# Patient Record
Sex: FEMALE | Race: WHITE | NOT HISPANIC OR LATINO
[De-identification: names, ages, dates, MRNs, and addresses within clinical notes are randomized per-mention and may not be internally consistent; named-entity substitution may affect disease eponyms.]

---

## 2017-06-28 PROBLEM — Z00.00 ENCOUNTER FOR PREVENTIVE HEALTH EXAMINATION: Status: ACTIVE | Noted: 2017-06-28

## 2017-07-19 ENCOUNTER — APPOINTMENT (OUTPATIENT)
Dept: UROGYNECOLOGY | Facility: CLINIC | Age: 52
End: 2017-07-19

## 2022-12-21 ENCOUNTER — APPOINTMENT (OUTPATIENT)
Dept: NEUROSURGERY | Facility: CLINIC | Age: 57
End: 2022-12-21

## 2022-12-21 VITALS — BODY MASS INDEX: 28.32 KG/M2 | HEIGHT: 61 IN | WEIGHT: 150 LBS

## 2022-12-21 DIAGNOSIS — Z80.0 FAMILY HISTORY OF MALIGNANT NEOPLASM OF DIGESTIVE ORGANS: ICD-10-CM

## 2022-12-21 DIAGNOSIS — Z87.19 PERSONAL HISTORY OF OTHER DISEASES OF THE DIGESTIVE SYSTEM: ICD-10-CM

## 2022-12-21 DIAGNOSIS — Z78.9 OTHER SPECIFIED HEALTH STATUS: ICD-10-CM

## 2022-12-21 DIAGNOSIS — I10 ESSENTIAL (PRIMARY) HYPERTENSION: ICD-10-CM

## 2022-12-21 PROCEDURE — 99204 OFFICE O/P NEW MOD 45 MIN: CPT

## 2022-12-21 RX ORDER — IBUPROFEN 800 MG/1
TABLET, FILM COATED ORAL
Refills: 0 | Status: ACTIVE | COMMUNITY

## 2022-12-21 RX ORDER — PANTOPRAZOLE SODIUM 20 MG/1
TABLET, DELAYED RELEASE ORAL
Refills: 0 | Status: ACTIVE | COMMUNITY

## 2022-12-21 NOTE — ASSESSMENT
[FreeTextEntry1] : Ms. Thomas has failed conservative management.  I think she would be a great candidate for an L2-3, L3-4 XLIF from a left sided approach.  However, I would like to see xrays with flexion extension of the lumbar spine, pelvis and hips to rule out instability at other levels, sacroiliac or hip issues.  She will follow-up with me after the xrays.

## 2022-12-21 NOTE — HISTORY OF PRESENT ILLNESS
[FreeTextEntry1] : low back pain radiating to the left leg [de-identified] : Ms. Thomas is a 57 year-old female who presents with low back pain radiating to her left leg and mildly to the right leg.  The pain radiates to the left hip and around to the front of the thigh and knee.  It is associated with numbness and tingling.  It is made worse with standing and ambulating.  It has gotten worse in the past 6 months.  She has had extensive PT and epidural steroid injections.\par \par MRI of the lumbar spine 11/2022 demonstrates severe DDD and scoliosis curve at L2-3 and L3-4 with left greater than right foraminal stenosis

## 2023-01-05 ENCOUNTER — APPOINTMENT (OUTPATIENT)
Dept: NEUROSURGERY | Facility: CLINIC | Age: 58
End: 2023-01-05
Payer: MEDICAID

## 2023-01-05 PROCEDURE — 99441: CPT

## 2023-01-10 NOTE — REASON FOR VISIT
[FreeTextEntry1] : Reviewed xrays with patient.  severe left hip OA.  \par \par Recommend addressing hip first\par referral given for ortho\par Will return after hip os addressed.

## 2023-05-15 ENCOUNTER — APPOINTMENT (OUTPATIENT)
Dept: NEUROSURGERY | Facility: CLINIC | Age: 58
End: 2023-05-15
Payer: MEDICAID

## 2023-05-15 VITALS — WEIGHT: 152 LBS | BODY MASS INDEX: 28.7 KG/M2 | HEIGHT: 61 IN

## 2023-05-15 PROCEDURE — 99213 OFFICE O/P EST LOW 20 MIN: CPT

## 2023-05-16 NOTE — REASON FOR VISIT
[Follow-Up: _____] : a [unfilled] follow-up visit [Family Member] : family member [FreeTextEntry1] : Ms. Rossi is a 57 year-old female who presents with left lower back pain radiating to her left groin and anterior thigh.  She had PT and ESIs extensively.  Upon work-up she had an L2-3, L3-4 deformity with left foraminal stenosis and disc.  She also had severe osteoarthritis of the hip.  She had a hip replacement 3 months ago.  Her groin pain resolved.  However, she reports the same back and thigh pain unresponsive to conservative measures.

## 2023-05-16 NOTE — ASSESSMENT
[FreeTextEntry1] : Ms. Rossi needs an updated MRI.  Provided there are no changes, I recommend a left L2-3, L3-4 minimally invasive transforaminal interbody fusion.  Risks and benefits were discussed and she wishes to proceed.

## 2023-06-07 ENCOUNTER — APPOINTMENT (OUTPATIENT)
Dept: HEART AND VASCULAR | Facility: CLINIC | Age: 58
End: 2023-06-07

## 2023-06-07 ENCOUNTER — NON-APPOINTMENT (OUTPATIENT)
Age: 58
End: 2023-06-07

## 2023-06-07 ENCOUNTER — APPOINTMENT (OUTPATIENT)
Dept: HEART AND VASCULAR | Facility: CLINIC | Age: 58
End: 2023-06-07
Payer: MEDICAID

## 2023-06-07 VITALS
WEIGHT: 150 LBS | SYSTOLIC BLOOD PRESSURE: 124 MMHG | HEIGHT: 61 IN | HEART RATE: 70 BPM | DIASTOLIC BLOOD PRESSURE: 80 MMHG | BODY MASS INDEX: 28.32 KG/M2 | RESPIRATION RATE: 12 BRPM

## 2023-06-07 DIAGNOSIS — R01.1 CARDIAC MURMUR, UNSPECIFIED: ICD-10-CM

## 2023-06-07 DIAGNOSIS — Z01.810 ENCOUNTER FOR PREPROCEDURAL CARDIOVASCULAR EXAMINATION: ICD-10-CM

## 2023-06-07 PROCEDURE — 93000 ELECTROCARDIOGRAM COMPLETE: CPT | Mod: NC

## 2023-06-07 PROCEDURE — 93306 TTE W/DOPPLER COMPLETE: CPT

## 2023-06-07 PROCEDURE — 99204 OFFICE O/P NEW MOD 45 MIN: CPT | Mod: 25

## 2023-06-07 NOTE — PHYSICAL EXAM
[General Appearance - Well Developed] : well developed [Normal Appearance] : normal appearance [Well Groomed] : well groomed [General Appearance - Well Nourished] : well nourished [No Deformities] : no deformities [General Appearance - In No Acute Distress] : no acute distress [Normal Oral Mucosa] : normal oral mucosa [No Oral Pallor] : no oral pallor [No Oral Cyanosis] : no oral cyanosis [Normal Jugular Venous A Waves Present] : normal jugular venous A waves present [Normal Jugular Venous V Waves Present] : normal jugular venous V waves present [No Jugular Venous Reeder A Waves] : no jugular venous reeder A waves [Respiration, Rhythm And Depth] : normal respiratory rhythm and effort [Exaggerated Use Of Accessory Muscles For Inspiration] : no accessory muscle use [Auscultation Breath Sounds / Voice Sounds] : lungs were clear to auscultation bilaterally [Heart Rate And Rhythm] : heart rate and rhythm were normal [Heart Sounds] : normal S1 and S2 [Arterial Pulses Normal] : the arterial pulses were normal [Edema] : no peripheral edema present [FreeTextEntry1] : III/VI SARATH [Abdomen Soft] : soft [Abdomen Tenderness] : non-tender [Abdomen Mass (___ Cm)] : no abdominal mass palpated [Abnormal Walk] : normal gait [Nail Clubbing] : no clubbing of the fingernails [Cyanosis, Localized] : no localized cyanosis [Petechial Hemorrhages (___cm)] : no petechial hemorrhages [] : no ischemic changes [Oriented To Time, Place, And Person] : oriented to person, place, and time [Affect] : the affect was normal [Mood] : the mood was normal [No Anxiety] : not feeling anxious

## 2023-06-07 NOTE — HISTORY OF PRESENT ILLNESS
[Preoperative Visit] : for a medical evaluation prior to surgery [Scheduled Procedure ___] : a [unfilled] [Good] : Good [Chest Pain] : no chest pain [Dyspnea] : no dyspnea [Lower Extremity Swelling] : no lower extremity swelling [FreeTextEntry1] : 57-year-old female with a past medical history of spondylosis comes in for preop clearance for elective spine surgery.  Patient does about 3 METS of daily activity without chest pain shortness of breath PND orthopnea.  She can walk for 30 minutes uninterrupted without symptoms.

## 2023-06-07 NOTE — DISCUSSION/SUMMARY
[Procedure Intermediate Risk] : the procedure risk is intermediate [Patient Low Risk] : the patient is a low surgical risk [Optimized for Surgery] : the patient is optimized for surgery [As per surgery] : as per surgery [Continue] : Continue medications as currently directed [FreeTextEntry1] : 1.  Preop clearance for elective spine surgery: Patient does 3 METS of daily activity without chest pain shortness of breath PND orthopnea.  EKG reviewed normal sinus rhythm with  APCs, no cardiac contraindication to planned procedure may proceed as planned.\par 2.  Cardiac murmur: Echocardiogram

## 2023-06-12 ENCOUNTER — NON-APPOINTMENT (OUTPATIENT)
Age: 58
End: 2023-06-12

## 2023-06-13 ENCOUNTER — APPOINTMENT (OUTPATIENT)
Dept: PAIN MANAGEMENT | Facility: CLINIC | Age: 58
End: 2023-06-13
Payer: MEDICAID

## 2023-06-13 ENCOUNTER — TRANSCRIPTION ENCOUNTER (OUTPATIENT)
Age: 58
End: 2023-06-13

## 2023-06-13 PROCEDURE — 99204 OFFICE O/P NEW MOD 45 MIN: CPT | Mod: 95

## 2023-06-15 NOTE — ASU PATIENT PROFILE, ADULT - VISION (WITH CORRECTIVE LENSES IF THE PATIENT USUALLY WEARS THEM):
locker 1/Partially impaired: cannot see medication labels or newsprint, but can see obstacles in path, and the surrounding layout; can count fingers at arm's length

## 2023-06-15 NOTE — DISCUSSION/SUMMARY
[de-identified] : Plan is for lumbar spine surgery with Dr. Taylor for lumbar spondylosis \par \par Postoperative Pain Medication recommendations\par \par start tylenol 975mg q8h standing\par start methocarbomal 750mg q8hr standing\par start oxycodone 5mg q4h prn\par start oxycodone 10mg q4hr prn\par start dilaudid 0.5mg q6hr prn\par \par \par \par iStop was checked an not on chronic opioid therapy\par

## 2023-06-15 NOTE — HISTORY OF PRESENT ILLNESS
[FreeTextEntry1] : \par Ms. Rossi is a 57 year-old female who presents with left lower back pain radiating to her left groin and anterior thigh. She had completed physical therapy and epidural steroid injections without relief. She was found to have an L2-3, L3-4 deformity with left foraminal stenosis and disc pathology. She also had severe osteoarthritis of the hip. She had a hip replacement 3 months ago. Her groin pain resolved. However, she reports the same back and thigh pain unresponsive to conservative measures. Pt denies bowel/bladder incontinence, weakness, falls, unsteadiness. The pain reaches an 8/10 in intensity which impedes her ADLs and QOL. She is here today for perioperative new patient visit for a left sided L2-L3, L3-4 minimally invasive transforaminal interbody fusion with Dr. Taylor. \par

## 2023-06-15 NOTE — ASU PATIENT PROFILE, ADULT - NSICDXPASTMEDICALHX_GEN_ALL_CORE_FT
PAST MEDICAL HISTORY:  GERD (gastroesophageal reflux disease)     HTN (hypertension)     S/P hysterectomy

## 2023-06-15 NOTE — CONSULT NOTE ADULT - SUBJECTIVE AND OBJECTIVE BOX
History of Present Illness    Ms. Rossi is a 57 year-old female who presents with left lower back pain radiating to her left groin and anterior thigh. She had completed physical therapy and epidural steroid injections without relief. She was found to have an L2-3, L3-4 deformity with left foraminal stenosis and disc pathology. She also had severe osteoarthritis of the hip. She had a hip replacement 3 months ago. Her groin pain resolved. However, she reports the same back and thigh pain unresponsive to conservative measures. Pt denies bowel/bladder incontinence, weakness, falls, unsteadiness. The pain reaches an 8/10 in intensity which impedes her ADLs and QOL. She is here today for perioperative new patient visit for a left sided L2-L3, L3-4 minimally invasive transforaminal interbody fusion with Dr. Taylor.        Current Meds  Ibuprofen TABS  Protonix TBEC    Allergies  Mobic    Physical Exam  telemedicine visit  General: WDWN female in NAD, sitting in a chair  lungs: no accessory muscle usage, no audible wheeze while observing via video        Assessment  Lumbar spondylosis (721.3) (M47.816)    Discussion/Summary   Plan is for lumbar spine surgery with Dr. Taylor for lumbar spondylosis     Postoperative Pain Medication recommendations    start tylenol 975mg q8h standing  start methocarbomal 750mg q8hr standing  start oxycodone 5mg q4h prn  start oxycodone 10mg q4hr prn  start dilaudid 0.5mg q6hr prn        iStop was checked an not on chronic opioid therapy

## 2023-06-15 NOTE — PHYSICAL EXAM
[de-identified] : General: WDWN female in NAD, sitting in a chair\par lungs: no accessory muscle usage, no audible wheeze while observing via video\par

## 2023-06-16 ENCOUNTER — INPATIENT (INPATIENT)
Facility: HOSPITAL | Age: 58
LOS: 2 days | Discharge: HOME HEALTH | DRG: 304 | End: 2023-06-19
Attending: NEUROLOGICAL SURGERY | Admitting: NEUROLOGICAL SURGERY
Payer: MEDICAID

## 2023-06-16 ENCOUNTER — APPOINTMENT (OUTPATIENT)
Dept: NEUROSURGERY | Facility: HOSPITAL | Age: 58
End: 2023-06-16

## 2023-06-16 VITALS
TEMPERATURE: 98 F | RESPIRATION RATE: 16 BRPM | SYSTOLIC BLOOD PRESSURE: 145 MMHG | OXYGEN SATURATION: 99 % | HEIGHT: 61 IN | DIASTOLIC BLOOD PRESSURE: 65 MMHG | WEIGHT: 149.91 LBS | HEART RATE: 59 BPM

## 2023-06-16 DIAGNOSIS — M47.816 SPONDYLOSIS WITHOUT MYELOPATHY OR RADICULOPATHY, LUMBAR REGION: ICD-10-CM

## 2023-06-16 LAB
ABO RH CONFIRMATION: SIGNIFICANT CHANGE UP
ALBUMIN SERPL ELPH-MCNC: 3.8 G/DL — SIGNIFICANT CHANGE UP (ref 3.5–5.2)
ALP SERPL-CCNC: 120 U/L — HIGH (ref 30–115)
ALT FLD-CCNC: 220 U/L — HIGH (ref 0–41)
ANION GAP SERPL CALC-SCNC: 8 MMOL/L — SIGNIFICANT CHANGE UP (ref 7–14)
AST SERPL-CCNC: 515 U/L — HIGH (ref 0–41)
BILIRUB SERPL-MCNC: 0.4 MG/DL — SIGNIFICANT CHANGE UP (ref 0.2–1.2)
BLD GP AB SCN SERPL QL: SIGNIFICANT CHANGE UP
BUN SERPL-MCNC: 13 MG/DL — SIGNIFICANT CHANGE UP (ref 10–20)
CALCIUM SERPL-MCNC: 9.1 MG/DL — SIGNIFICANT CHANGE UP (ref 8.4–10.5)
CHLORIDE SERPL-SCNC: 106 MMOL/L — SIGNIFICANT CHANGE UP (ref 98–110)
CO2 SERPL-SCNC: 24 MMOL/L — SIGNIFICANT CHANGE UP (ref 17–32)
CREAT SERPL-MCNC: 0.6 MG/DL — LOW (ref 0.7–1.5)
EGFR: 103 ML/MIN/1.73M2 — SIGNIFICANT CHANGE UP
GLUCOSE SERPL-MCNC: 173 MG/DL — HIGH (ref 70–99)
HCT VFR BLD CALC: 32.4 % — LOW (ref 37–47)
HGB BLD-MCNC: 10.9 G/DL — LOW (ref 12–16)
MAGNESIUM SERPL-MCNC: 2 MG/DL — SIGNIFICANT CHANGE UP (ref 1.8–2.4)
MCHC RBC-ENTMCNC: 28.5 PG — SIGNIFICANT CHANGE UP (ref 27–31)
MCHC RBC-ENTMCNC: 33.6 G/DL — SIGNIFICANT CHANGE UP (ref 32–37)
MCV RBC AUTO: 84.6 FL — SIGNIFICANT CHANGE UP (ref 81–99)
NRBC # BLD: 0 /100 WBCS — SIGNIFICANT CHANGE UP (ref 0–0)
PLATELET # BLD AUTO: 272 K/UL — SIGNIFICANT CHANGE UP (ref 130–400)
PMV BLD: 10.1 FL — SIGNIFICANT CHANGE UP (ref 7.4–10.4)
POTASSIUM SERPL-MCNC: 4.1 MMOL/L — SIGNIFICANT CHANGE UP (ref 3.5–5)
POTASSIUM SERPL-SCNC: 4.1 MMOL/L — SIGNIFICANT CHANGE UP (ref 3.5–5)
PROT SERPL-MCNC: 6.1 G/DL — SIGNIFICANT CHANGE UP (ref 6–8)
RBC # BLD: 3.83 M/UL — LOW (ref 4.2–5.4)
RBC # FLD: 13.1 % — SIGNIFICANT CHANGE UP (ref 11.5–14.5)
SODIUM SERPL-SCNC: 138 MMOL/L — SIGNIFICANT CHANGE UP (ref 135–146)
WBC # BLD: 8.79 K/UL — SIGNIFICANT CHANGE UP (ref 4.8–10.8)
WBC # FLD AUTO: 8.79 K/UL — SIGNIFICANT CHANGE UP (ref 4.8–10.8)

## 2023-06-16 PROCEDURE — 80048 BASIC METABOLIC PNL TOTAL CA: CPT

## 2023-06-16 PROCEDURE — C1769: CPT

## 2023-06-16 PROCEDURE — 22842 INSERT SPINE FIXATION DEVICE: CPT | Mod: 82

## 2023-06-16 PROCEDURE — C1713: CPT

## 2023-06-16 PROCEDURE — 83735 ASSAY OF MAGNESIUM: CPT

## 2023-06-16 PROCEDURE — 80076 HEPATIC FUNCTION PANEL: CPT

## 2023-06-16 PROCEDURE — 22558 ARTHRD ANT NTRBD MIN DSC LUM: CPT | Mod: 82

## 2023-06-16 PROCEDURE — 22585 ARTHRD ANT NTRBD MIN DSC EA: CPT

## 2023-06-16 PROCEDURE — 97162 PT EVAL MOD COMPLEX 30 MIN: CPT | Mod: GP

## 2023-06-16 PROCEDURE — 36415 COLL VENOUS BLD VENIPUNCTURE: CPT

## 2023-06-16 PROCEDURE — 85027 COMPLETE CBC AUTOMATED: CPT

## 2023-06-16 PROCEDURE — 22842 INSERT SPINE FIXATION DEVICE: CPT

## 2023-06-16 PROCEDURE — 86901 BLOOD TYPING SEROLOGIC RH(D): CPT

## 2023-06-16 PROCEDURE — 80053 COMPREHEN METABOLIC PANEL: CPT

## 2023-06-16 PROCEDURE — 22585 ARTHRD ANT NTRBD MIN DSC EA: CPT | Mod: 82

## 2023-06-16 PROCEDURE — 22558 ARTHRD ANT NTRBD MIN DSC LUM: CPT

## 2023-06-16 PROCEDURE — 22853 INSJ BIOMECHANICAL DEVICE: CPT

## 2023-06-16 PROCEDURE — 72110 X-RAY EXAM L-2 SPINE 4/>VWS: CPT

## 2023-06-16 PROCEDURE — 61783 SCAN PROC SPINAL: CPT | Mod: 82

## 2023-06-16 PROCEDURE — 86900 BLOOD TYPING SEROLOGIC ABO: CPT

## 2023-06-16 PROCEDURE — 86803 HEPATITIS C AB TEST: CPT

## 2023-06-16 PROCEDURE — 22853 INSJ BIOMECHANICAL DEVICE: CPT | Mod: 82

## 2023-06-16 PROCEDURE — 97166 OT EVAL MOD COMPLEX 45 MIN: CPT | Mod: GO

## 2023-06-16 PROCEDURE — C1751: CPT

## 2023-06-16 PROCEDURE — C1889: CPT

## 2023-06-16 PROCEDURE — 86850 RBC ANTIBODY SCREEN: CPT

## 2023-06-16 PROCEDURE — 61783 SCAN PROC SPINAL: CPT

## 2023-06-16 RX ORDER — SENNA PLUS 8.6 MG/1
2 TABLET ORAL AT BEDTIME
Refills: 0 | Status: DISCONTINUED | OUTPATIENT
Start: 2023-06-16 | End: 2023-06-19

## 2023-06-16 RX ORDER — ACETAMINOPHEN 500 MG
1000 TABLET ORAL EVERY 8 HOURS
Refills: 0 | Status: DISCONTINUED | OUTPATIENT
Start: 2023-06-16 | End: 2023-06-19

## 2023-06-16 RX ORDER — METOCLOPRAMIDE HCL 10 MG
10 TABLET ORAL ONCE
Refills: 0 | Status: DISCONTINUED | OUTPATIENT
Start: 2023-06-16 | End: 2023-06-16

## 2023-06-16 RX ORDER — HYDROMORPHONE HYDROCHLORIDE 2 MG/ML
30 INJECTION INTRAMUSCULAR; INTRAVENOUS; SUBCUTANEOUS
Refills: 0 | Status: DISCONTINUED | OUTPATIENT
Start: 2023-06-16 | End: 2023-06-18

## 2023-06-16 RX ORDER — ATENOLOL 25 MG/1
1 TABLET ORAL
Refills: 0 | DISCHARGE

## 2023-06-16 RX ORDER — OXYCODONE HYDROCHLORIDE 5 MG/1
10 TABLET ORAL EVERY 4 HOURS
Refills: 0 | Status: DISCONTINUED | OUTPATIENT
Start: 2023-06-16 | End: 2023-06-16

## 2023-06-16 RX ORDER — POLYETHYLENE GLYCOL 3350 17 G/17G
17 POWDER, FOR SOLUTION ORAL DAILY
Refills: 0 | Status: DISCONTINUED | OUTPATIENT
Start: 2023-06-16 | End: 2023-06-19

## 2023-06-16 RX ORDER — ACETAMINOPHEN 500 MG
1000 TABLET ORAL ONCE
Refills: 0 | Status: COMPLETED | OUTPATIENT
Start: 2023-06-16 | End: 2023-06-16

## 2023-06-16 RX ORDER — APREPITANT 80 MG/1
40 CAPSULE ORAL ONCE
Refills: 0 | Status: COMPLETED | OUTPATIENT
Start: 2023-06-16 | End: 2023-06-16

## 2023-06-16 RX ORDER — GABAPENTIN 400 MG/1
300 CAPSULE ORAL ONCE
Refills: 0 | Status: COMPLETED | OUTPATIENT
Start: 2023-06-16 | End: 2023-06-16

## 2023-06-16 RX ORDER — HYDROMORPHONE HYDROCHLORIDE 2 MG/ML
0.5 INJECTION INTRAMUSCULAR; INTRAVENOUS; SUBCUTANEOUS
Refills: 0 | Status: DISCONTINUED | OUTPATIENT
Start: 2023-06-16 | End: 2023-06-16

## 2023-06-16 RX ORDER — OXYCODONE HYDROCHLORIDE 5 MG/1
5 TABLET ORAL EVERY 4 HOURS
Refills: 0 | Status: DISCONTINUED | OUTPATIENT
Start: 2023-06-16 | End: 2023-06-16

## 2023-06-16 RX ORDER — ONDANSETRON 8 MG/1
4 TABLET, FILM COATED ORAL EVERY 6 HOURS
Refills: 0 | Status: DISCONTINUED | OUTPATIENT
Start: 2023-06-16 | End: 2023-06-19

## 2023-06-16 RX ORDER — PANTOPRAZOLE SODIUM 20 MG/1
40 TABLET, DELAYED RELEASE ORAL
Refills: 0 | Status: DISCONTINUED | OUTPATIENT
Start: 2023-06-16 | End: 2023-06-19

## 2023-06-16 RX ORDER — SODIUM CHLORIDE 9 MG/ML
1000 INJECTION, SOLUTION INTRAVENOUS
Refills: 0 | Status: DISCONTINUED | OUTPATIENT
Start: 2023-06-16 | End: 2023-06-16

## 2023-06-16 RX ORDER — ATENOLOL 25 MG/1
25 TABLET ORAL DAILY
Refills: 0 | Status: DISCONTINUED | OUTPATIENT
Start: 2023-06-16 | End: 2023-06-19

## 2023-06-16 RX ORDER — CEFAZOLIN SODIUM 1 G
1000 VIAL (EA) INJECTION EVERY 8 HOURS
Refills: 0 | Status: DISCONTINUED | OUTPATIENT
Start: 2023-06-16 | End: 2023-06-18

## 2023-06-16 RX ORDER — NALOXONE HYDROCHLORIDE 4 MG/.1ML
0.1 SPRAY NASAL
Refills: 0 | Status: DISCONTINUED | OUTPATIENT
Start: 2023-06-16 | End: 2023-06-19

## 2023-06-16 RX ADMIN — SODIUM CHLORIDE 100 MILLILITER(S): 9 INJECTION, SOLUTION INTRAVENOUS at 14:00

## 2023-06-16 RX ADMIN — Medication 1000 MILLIGRAM(S): at 22:15

## 2023-06-16 RX ADMIN — Medication 100 MILLIGRAM(S): at 17:22

## 2023-06-16 RX ADMIN — HYDROMORPHONE HYDROCHLORIDE 30 MILLILITER(S): 2 INJECTION INTRAMUSCULAR; INTRAVENOUS; SUBCUTANEOUS at 13:55

## 2023-06-16 RX ADMIN — SENNA PLUS 2 TABLET(S): 8.6 TABLET ORAL at 22:14

## 2023-06-16 RX ADMIN — APREPITANT 40 MILLIGRAM(S): 80 CAPSULE ORAL at 06:39

## 2023-06-16 RX ADMIN — Medication 1000 MILLIGRAM(S): at 23:30

## 2023-06-16 RX ADMIN — GABAPENTIN 300 MILLIGRAM(S): 400 CAPSULE ORAL at 06:39

## 2023-06-16 RX ADMIN — Medication 1000 MILLIGRAM(S): at 06:39

## 2023-06-16 RX ADMIN — Medication 1000 MILLIGRAM(S): at 15:29

## 2023-06-16 NOTE — BRIEF OPERATIVE NOTE - NSICDXBRIEFPREOP_GEN_ALL_CORE_FT
PRE-OP DIAGNOSIS:  Acquired spinal deformity 16-Jun-2023 12:36:23  Benny Dominguez  Lumbar stenosis with neurogenic claudication 16-Jun-2023 12:36:30  Benny Dominguez

## 2023-06-16 NOTE — CONSULT NOTE ADULT - SUBJECTIVE AND OBJECTIVE BOX
HPI: 58 yo F admitted to [facility] on 6/16/23 for L2-3, L3-4 XLIF; L2-4 posterior instrumentation for lumbar spondylosis. Prior hospitalization she was ambulating independent w/o AD. Rehab consulted for eval.        PAST MEDICAL & SURGICAL HISTORY:  HTN (hypertension)      GERD (gastroesophageal reflux disease)      S/P hysterectomy          Hospital Course:    TODAY'S SUBJECTIVE & REVIEW OF SYMPTOMS:     Constitutional WNL   Cardio WNL   Resp WNL   GI WNL  Heme WNL  Endo WNL  Skin WNL  MSK back pain   Neuro WNL  Cognitive WNL  Psych WNL      MEDICATIONS  (STANDING):  acetaminophen     Tablet .. 1000 milliGRAM(s) Oral every 8 hours  atenolol  Tablet 25 milliGRAM(s) Oral daily  ceFAZolin   IVPB 1000 milliGRAM(s) IV Intermittent every 8 hours  HYDROmorphone PCA (1 mG/mL) 30 milliLiter(s) PCA Continuous PCA Continuous  pantoprazole    Tablet 40 milliGRAM(s) Oral before breakfast  polyethylene glycol 3350 17 Gram(s) Oral daily  senna 2 Tablet(s) Oral at bedtime    MEDICATIONS  (PRN):  naloxone Injectable 0.1 milliGRAM(s) IV Push every 3 minutes PRN For ANY of the following changes in patient status:  A. RR LESS THAN 10 breaths per minute, B. Oxygen saturation LESS THAN 90%, C. Sedation score of 6  ondansetron Injectable 4 milliGRAM(s) IV Push every 6 hours PRN Nausea  ondansetron Injectable 4 milliGRAM(s) IV Push every 6 hours PRN Nausea and/or Vomiting      FAMILY HISTORY:      Allergies    Mobic (Swelling)    Intolerances        SOCIAL HISTORY:    [  ] Etoh  [  ] Smoking  [  ] Substance abuse     Home Environment:  [   ] Home Alone  [ x  ] Lives with Family  [   ] Home Health Aid    Dwelling:  [   ] Apartment  [  x ] Private House  [   ] Adult Home  [   ] Skilled Nursing Facility      [   ] Short Term  [   ] Long Term  [ x  ] Stairs       Elevator [   ]    FUNCTIONAL STATUS PTA: (Check all that apply)  Ambulation: [  x  ]Independent    [   ] Dependent     [   ] Non-Ambulatory  Assistive Device: [   ] SA Cane  [   ]  Q Cane  [   ] Walker  [   ]  Wheelchair  ADL : [ x  ] Independent  [    ]  Dependent       Vital Signs Last 24 Hrs  T(C): 35.9 (16 Jun 2023 18:27), Max: 36.4 (16 Jun 2023 06:46)  T(F): 96.7 (16 Jun 2023 18:27), Max: 97.6 (16 Jun 2023 06:46)  HR: 68 (16 Jun 2023 18:27) (54 - 68)  BP: 131/64 (16 Jun 2023 18:27) (103/57 - 166/76)  BP(mean): --  RR: 18 (16 Jun 2023 18:27) (12 - 20)  SpO2: 95% (16 Jun 2023 18:27) (95% - 100%)    Parameters below as of 16 Jun 2023 18:27  Patient On (Oxygen Delivery Method): room air          PHYSICAL EXAM: Awake & Alert  GENERAL: NAD  HEAD:  Normocephalic  CHEST/LUNG: Clear   HEART: S1S2+  ABDOMEN: Soft, Nontender  EXTREMITIES:  no calf tenderness    NERVOUS SYSTEM:  Cranial Nerves 2-12 intact [   ] Abnormal  [   ]  ROM: WFL all extremities [ x  ]  Abnormal [   ]  Motor Strength: WFL all extremities  [ x  ]  Abnormal [   ]  Sensation: intact to light touch [ x  ] Abnormal [   ]    FUNCTIONAL STATUS:  Bed Mobility: Independent [   ]  Supervision [   ]  Needs Assistance [ x  ]  N/A [   ]  Transfers: Independent [   ]  Supervision [   ]  Needs Assistance [   ]  N/A [   ]   Ambulation: Independent [   ]  Supervision [   ]  Needs Assistance [   ]  N/A [   ]  ADL: Independent [   ] Requires Assistance [   ] N/A [   ]      LABS:                        10.9   8.79  )-----------( 272      ( 16 Jun 2023 14:21 )             32.4     06-16    138  |  106  |  13  ----------------------------<  173<H>  4.1   |  24  |  0.6<L>    Ca    9.1      16 Jun 2023 14:21  Mg     2.0     06-16    TPro  6.1  /  Alb  3.8  /  TBili  0.4  /  DBili  x   /  AST  515<H>  /  ALT  220<H>  /  AlkPhos  120<H>  06-16          RADIOLOGY & ADDITIONAL STUDIES:

## 2023-06-16 NOTE — BRIEF OPERATIVE NOTE - NSICDXBRIEFPOSTOP_GEN_ALL_CORE_FT
POST-OP DIAGNOSIS:  Lumbar stenosis with neurogenic claudication 16-Jun-2023 12:36:37  Benny Dominguez  Acquired spinal deformity 16-Jun-2023 12:36:32  Benny Dominguez

## 2023-06-16 NOTE — CHART NOTE - NSCHARTNOTEFT_GEN_A_CORE
PACU ANESTHESIA ADMISSION NOTE      Procedure: Fusion, spine, XLIF, 2 levels    Percutaneous insertion of pedicle-based spinal stabilization device into joint of lumbar vertebra      Post op diagnosis:  Acquired spinal deformity    Lumbar stenosis with neurogenic claudication        ____  Intubated  TV:______       Rate: ______      FiO2: ______    __x__  Patent Airway    __x__  Full return of protective reflexes    __x__  Full recovery from anesthesia / back to baseline status    Vitals:  T(C): 36.4 (06-16-23 @ 07:30), Max: 36.4 (06-16-23 @ 06:46)  HR: 59 (06-16-23 @ 07:30) (59 - 59)  BP: 145/65 (06-16-23 @ 07:30) (145/65 - 145/65)  RR: 16 (06-16-23 @ 07:30) (16 - 16)  SpO2: 99% (06-16-23 @ 07:30) (99% - 99%)    Mental Status:  __x__ Awake   ___x__ Alert   _____ Drowsy   _____ Sedated    Nausea/Vomiting:  __x__ NO  ______Yes,   See Post - Op Orders          Pain Scale (0-10):  _____    Treatment: ____ None    __x__ See Post - Op/PCA Orders    Post - Operative Fluids:   ____ Oral   __x__ See Post - Op Orders    Plan: Discharge:   ____Home       __x___Floor     _____Critical Care    _____  Other:_________________    Comments: Patient had smooth intraoperative event, no anesthesia complication.  PACU Vital signs: HR:  64          BP:    103    /  57        RR:  17           O2 Sat:   100    %     Temp 97.9

## 2023-06-16 NOTE — PRE-ANESTHESIA EVALUATION ADULT - HEIGHT IN INCHES
Pt called with a question on her insuline she cant remember if it was turned up or not? Please call [phone]   1

## 2023-06-16 NOTE — BRIEF OPERATIVE NOTE - NSICDXBRIEFPROCEDURE_GEN_ALL_CORE_FT
PROCEDURES:  Fusion, spine, XLIF, 2 levels 16-Jun-2023 12:35:54  Benny Dominguez  Percutaneous insertion of pedicle-based spinal stabilization device into joint of lumbar vertebra 16-Jun-2023 12:41:23  Lizbeth Taylor  
PROCEDURES:  Fusion, spine, XLIF, 2 levels 16-Jun-2023 12:35:54  Benny Dominguez

## 2023-06-16 NOTE — PATIENT PROFILE ADULT - FALL HARM RISK - HARM RISK INTERVENTIONS

## 2023-06-16 NOTE — CONSULT NOTE ADULT - ASSESSMENT
IMPRESSION: Rehab of lumbar spondylosis / s/p  L2-3, L3-4 XLIF; L2-4 posterior instrumentation    PRECAUTIONS: [   ] Cardiac  [   ] Respiratory  [   ] Seizures [   ] Contact Isolation  [   ] Droplet Isolation  [   ] Other    Weight Bearing Status:     RECOMMENDATION:    Out of Bed to Chair     DVT/Decubiti Prophylaxis    REHAB PLAN:     [ x   ] Bedside P/T 3-5 times a week   [    ]   Bedside O/T  2-3 times a week             [    ] Speech Therapy               [    ]  No Rehab Therapy Indicated   Conditioning/ROM                                    ADL  Bed Mobility                                               Conditioning/ROM  Transfers                                                     Bed Mobility  Sitting /Standing Balance                         Transfers                                        Gait Training                                               Sitting/Standing Balance  Stair Training [   ]Applicable                    Home equipment Eval                                                                        Splinting  [   ] Only      GOALS:   ADL   [    ]   Independent                    Transfers  [  x  ] Independent                          Ambulation  [  x  ] Independent     [ x    ] With device                            [    ]  CG                                                         [    ]  CG                                                                  [    ] CG                            [    ] Min A                                                   [    ] Min A                                                              [    ] Min  A          DISCHARGE PLAN:   [    ]  Good candidate for Intensive Rehabilitation/Hospital based                                             Will tolerate 3hrs Intensive Rehab Daily                                       [  x   ]  Short Term Rehab in Skilled Nursing Facility                             vs          [  x   ]  Home with Outpatient or VN services                                         [     ]  Possible Candidate for Intensive Hospital based Rehab

## 2023-06-17 LAB
ALBUMIN SERPL ELPH-MCNC: 3.6 G/DL — SIGNIFICANT CHANGE UP (ref 3.5–5.2)
ALP SERPL-CCNC: 129 U/L — HIGH (ref 30–115)
ALT FLD-CCNC: 217 U/L — HIGH (ref 0–41)
ANION GAP SERPL CALC-SCNC: 9 MMOL/L — SIGNIFICANT CHANGE UP (ref 7–14)
AST SERPL-CCNC: 220 U/L — HIGH (ref 0–41)
BILIRUB DIRECT SERPL-MCNC: <0.2 MG/DL — SIGNIFICANT CHANGE UP (ref 0–0.3)
BILIRUB INDIRECT FLD-MCNC: >0.2 MG/DL — SIGNIFICANT CHANGE UP (ref 0.2–1.2)
BILIRUB SERPL-MCNC: 0.4 MG/DL — SIGNIFICANT CHANGE UP (ref 0.2–1.2)
BUN SERPL-MCNC: 13 MG/DL — SIGNIFICANT CHANGE UP (ref 10–20)
CALCIUM SERPL-MCNC: 9.2 MG/DL — SIGNIFICANT CHANGE UP (ref 8.4–10.4)
CHLORIDE SERPL-SCNC: 102 MMOL/L — SIGNIFICANT CHANGE UP (ref 98–110)
CO2 SERPL-SCNC: 28 MMOL/L — SIGNIFICANT CHANGE UP (ref 17–32)
CREAT SERPL-MCNC: 0.7 MG/DL — SIGNIFICANT CHANGE UP (ref 0.7–1.5)
EGFR: 100 ML/MIN/1.73M2 — SIGNIFICANT CHANGE UP
GLUCOSE SERPL-MCNC: 108 MG/DL — HIGH (ref 70–99)
HCT VFR BLD CALC: 32.3 % — LOW (ref 37–47)
HCV AB S/CO SERPL IA: 0.09 COI — SIGNIFICANT CHANGE UP
HCV AB SERPL-IMP: SIGNIFICANT CHANGE UP
HGB BLD-MCNC: 10.5 G/DL — LOW (ref 12–16)
MAGNESIUM SERPL-MCNC: 1.9 MG/DL — SIGNIFICANT CHANGE UP (ref 1.8–2.4)
MCHC RBC-ENTMCNC: 28 PG — SIGNIFICANT CHANGE UP (ref 27–31)
MCHC RBC-ENTMCNC: 32.5 G/DL — SIGNIFICANT CHANGE UP (ref 32–37)
MCV RBC AUTO: 86.1 FL — SIGNIFICANT CHANGE UP (ref 81–99)
NRBC # BLD: 0 /100 WBCS — SIGNIFICANT CHANGE UP (ref 0–0)
PLATELET # BLD AUTO: 299 K/UL — SIGNIFICANT CHANGE UP (ref 130–400)
PMV BLD: 10.6 FL — HIGH (ref 7.4–10.4)
POTASSIUM SERPL-MCNC: 4.8 MMOL/L — SIGNIFICANT CHANGE UP (ref 3.5–5)
POTASSIUM SERPL-SCNC: 4.8 MMOL/L — SIGNIFICANT CHANGE UP (ref 3.5–5)
PROT SERPL-MCNC: 6.1 G/DL — SIGNIFICANT CHANGE UP (ref 6–8)
RBC # BLD: 3.75 M/UL — LOW (ref 4.2–5.4)
RBC # FLD: 13.1 % — SIGNIFICANT CHANGE UP (ref 11.5–14.5)
SODIUM SERPL-SCNC: 139 MMOL/L — SIGNIFICANT CHANGE UP (ref 135–146)
WBC # BLD: 7.27 K/UL — SIGNIFICANT CHANGE UP (ref 4.8–10.8)
WBC # FLD AUTO: 7.27 K/UL — SIGNIFICANT CHANGE UP (ref 4.8–10.8)

## 2023-06-17 PROCEDURE — 99232 SBSQ HOSP IP/OBS MODERATE 35: CPT

## 2023-06-17 RX ORDER — METHOCARBAMOL 500 MG/1
750 TABLET, FILM COATED ORAL EVERY 6 HOURS
Refills: 0 | Status: DISCONTINUED | OUTPATIENT
Start: 2023-06-17 | End: 2023-06-19

## 2023-06-17 RX ORDER — HEPARIN SODIUM 5000 [USP'U]/ML
5000 INJECTION INTRAVENOUS; SUBCUTANEOUS EVERY 8 HOURS
Refills: 0 | Status: DISCONTINUED | OUTPATIENT
Start: 2023-06-17 | End: 2023-06-19

## 2023-06-17 RX ADMIN — METHOCARBAMOL 750 MILLIGRAM(S): 500 TABLET, FILM COATED ORAL at 18:52

## 2023-06-17 RX ADMIN — ATENOLOL 25 MILLIGRAM(S): 25 TABLET ORAL at 05:36

## 2023-06-17 RX ADMIN — HEPARIN SODIUM 5000 UNIT(S): 5000 INJECTION INTRAVENOUS; SUBCUTANEOUS at 11:01

## 2023-06-17 RX ADMIN — Medication 1000 MILLIGRAM(S): at 13:47

## 2023-06-17 RX ADMIN — PANTOPRAZOLE SODIUM 40 MILLIGRAM(S): 20 TABLET, DELAYED RELEASE ORAL at 05:37

## 2023-06-17 RX ADMIN — ONDANSETRON 4 MILLIGRAM(S): 8 TABLET, FILM COATED ORAL at 16:39

## 2023-06-17 RX ADMIN — METHOCARBAMOL 750 MILLIGRAM(S): 500 TABLET, FILM COATED ORAL at 23:34

## 2023-06-17 RX ADMIN — Medication 100 MILLIGRAM(S): at 10:03

## 2023-06-17 RX ADMIN — Medication 1000 MILLIGRAM(S): at 05:36

## 2023-06-17 RX ADMIN — Medication 1000 MILLIGRAM(S): at 06:27

## 2023-06-17 RX ADMIN — HEPARIN SODIUM 5000 UNIT(S): 5000 INJECTION INTRAVENOUS; SUBCUTANEOUS at 21:12

## 2023-06-17 RX ADMIN — Medication 100 MILLIGRAM(S): at 01:11

## 2023-06-17 RX ADMIN — SENNA PLUS 2 TABLET(S): 8.6 TABLET ORAL at 21:12

## 2023-06-17 RX ADMIN — METHOCARBAMOL 750 MILLIGRAM(S): 500 TABLET, FILM COATED ORAL at 13:47

## 2023-06-17 RX ADMIN — Medication 100 MILLIGRAM(S): at 16:39

## 2023-06-17 RX ADMIN — POLYETHYLENE GLYCOL 3350 17 GRAM(S): 17 POWDER, FOR SOLUTION ORAL at 11:01

## 2023-06-17 NOTE — OCCUPATIONAL THERAPY INITIAL EVALUATION ADULT - GENERAL OBSERVATIONS, REHAB EVAL
Pt encountered reclined in bed, + IV, +PCA pump, + pérez, + bilateral sequentials. Pt agreeable to bedside OT assessment, may be seen as confirmed with RN. Pt returned to bedside chair, + IV, +PCA pump, + pérez, + chair alarm

## 2023-06-17 NOTE — OCCUPATIONAL THERAPY INITIAL EVALUATION ADULT - NSACTIVITYREC_GEN_A_OT
Patient requires assistancet with activities of daily living. OT recommends D/C to home with assistance vs rehabilitation facility when medically appropriate. OT recommends commode if D/C to home. Refer to evaluation/treatment for details.

## 2023-06-17 NOTE — OCCUPATIONAL THERAPY INITIAL EVALUATION ADULT - ADDITIONAL COMMENTS
Pt lives in private home with stairs, + 1st floor master, + tub, does not drive, + RW within home from recent hip surgery however not utilizing

## 2023-06-17 NOTE — OCCUPATIONAL THERAPY INITIAL EVALUATION ADULT - PLANNED THERAPY INTERVENTIONS, OT EVAL
ADL retraining/balance training/bed mobility training/parent/caregiver training.../strengthening/stretching/transfer training

## 2023-06-17 NOTE — OCCUPATIONAL THERAPY INITIAL EVALUATION ADULT - TRANSFER TRAINING, PT EVAL
Pt will perform sit <>stand with Supervision and bed<>chair with CG A using most appropriate AD by discharge

## 2023-06-18 ENCOUNTER — TRANSCRIPTION ENCOUNTER (OUTPATIENT)
Age: 58
End: 2023-06-18

## 2023-06-18 LAB
ALBUMIN SERPL ELPH-MCNC: 3.4 G/DL — LOW (ref 3.5–5.2)
ALP SERPL-CCNC: 155 U/L — HIGH (ref 30–115)
ALT FLD-CCNC: 185 U/L — HIGH (ref 0–41)
ANION GAP SERPL CALC-SCNC: 12 MMOL/L — SIGNIFICANT CHANGE UP (ref 7–14)
AST SERPL-CCNC: 211 U/L — HIGH (ref 0–41)
BILIRUB SERPL-MCNC: 0.3 MG/DL — SIGNIFICANT CHANGE UP (ref 0.2–1.2)
BUN SERPL-MCNC: 9 MG/DL — LOW (ref 10–20)
CALCIUM SERPL-MCNC: 8.9 MG/DL — SIGNIFICANT CHANGE UP (ref 8.4–10.5)
CHLORIDE SERPL-SCNC: 96 MMOL/L — LOW (ref 98–110)
CO2 SERPL-SCNC: 27 MMOL/L — SIGNIFICANT CHANGE UP (ref 17–32)
CREAT SERPL-MCNC: 0.6 MG/DL — LOW (ref 0.7–1.5)
EGFR: 103 ML/MIN/1.73M2 — SIGNIFICANT CHANGE UP
GLUCOSE SERPL-MCNC: 110 MG/DL — HIGH (ref 70–99)
POTASSIUM SERPL-MCNC: 3.7 MMOL/L — SIGNIFICANT CHANGE UP (ref 3.5–5)
POTASSIUM SERPL-SCNC: 3.7 MMOL/L — SIGNIFICANT CHANGE UP (ref 3.5–5)
PROT SERPL-MCNC: 5.9 G/DL — LOW (ref 6–8)
SODIUM SERPL-SCNC: 135 MMOL/L — SIGNIFICANT CHANGE UP (ref 135–146)

## 2023-06-18 PROCEDURE — 99232 SBSQ HOSP IP/OBS MODERATE 35: CPT

## 2023-06-18 RX ORDER — OXYCODONE HYDROCHLORIDE 5 MG/1
10 TABLET ORAL EVERY 4 HOURS
Refills: 0 | Status: DISCONTINUED | OUTPATIENT
Start: 2023-06-18 | End: 2023-06-19

## 2023-06-18 RX ORDER — OXYCODONE HYDROCHLORIDE 5 MG/1
5 TABLET ORAL EVERY 4 HOURS
Refills: 0 | Status: DISCONTINUED | OUTPATIENT
Start: 2023-06-18 | End: 2023-06-19

## 2023-06-18 RX ORDER — HYDROMORPHONE HYDROCHLORIDE 2 MG/ML
1 INJECTION INTRAMUSCULAR; INTRAVENOUS; SUBCUTANEOUS
Refills: 0 | Status: DISCONTINUED | OUTPATIENT
Start: 2023-06-18 | End: 2023-06-19

## 2023-06-18 RX ADMIN — PANTOPRAZOLE SODIUM 40 MILLIGRAM(S): 20 TABLET, DELAYED RELEASE ORAL at 05:15

## 2023-06-18 RX ADMIN — ONDANSETRON 4 MILLIGRAM(S): 8 TABLET, FILM COATED ORAL at 12:47

## 2023-06-18 RX ADMIN — Medication 1000 MILLIGRAM(S): at 18:11

## 2023-06-18 RX ADMIN — POLYETHYLENE GLYCOL 3350 17 GRAM(S): 17 POWDER, FOR SOLUTION ORAL at 11:08

## 2023-06-18 RX ADMIN — HEPARIN SODIUM 5000 UNIT(S): 5000 INJECTION INTRAVENOUS; SUBCUTANEOUS at 05:15

## 2023-06-18 RX ADMIN — Medication 1000 MILLIGRAM(S): at 05:15

## 2023-06-18 RX ADMIN — Medication 1000 MILLIGRAM(S): at 13:49

## 2023-06-18 RX ADMIN — ATENOLOL 25 MILLIGRAM(S): 25 TABLET ORAL at 05:15

## 2023-06-18 RX ADMIN — OXYCODONE HYDROCHLORIDE 10 MILLIGRAM(S): 5 TABLET ORAL at 18:11

## 2023-06-18 RX ADMIN — Medication 1000 MILLIGRAM(S): at 22:48

## 2023-06-18 RX ADMIN — Medication 100 MILLIGRAM(S): at 00:11

## 2023-06-18 RX ADMIN — HEPARIN SODIUM 5000 UNIT(S): 5000 INJECTION INTRAVENOUS; SUBCUTANEOUS at 13:50

## 2023-06-18 RX ADMIN — OXYCODONE HYDROCHLORIDE 10 MILLIGRAM(S): 5 TABLET ORAL at 12:45

## 2023-06-18 RX ADMIN — METHOCARBAMOL 750 MILLIGRAM(S): 500 TABLET, FILM COATED ORAL at 11:07

## 2023-06-18 RX ADMIN — HEPARIN SODIUM 5000 UNIT(S): 5000 INJECTION INTRAVENOUS; SUBCUTANEOUS at 22:52

## 2023-06-18 RX ADMIN — METHOCARBAMOL 750 MILLIGRAM(S): 500 TABLET, FILM COATED ORAL at 05:15

## 2023-06-18 RX ADMIN — SENNA PLUS 2 TABLET(S): 8.6 TABLET ORAL at 22:48

## 2023-06-18 RX ADMIN — Medication 1000 MILLIGRAM(S): at 06:15

## 2023-06-18 RX ADMIN — OXYCODONE HYDROCHLORIDE 10 MILLIGRAM(S): 5 TABLET ORAL at 20:52

## 2023-06-18 RX ADMIN — METHOCARBAMOL 750 MILLIGRAM(S): 500 TABLET, FILM COATED ORAL at 17:23

## 2023-06-18 RX ADMIN — Medication 100 MILLIGRAM(S): at 09:02

## 2023-06-18 NOTE — PHYSICAL THERAPY INITIAL EVALUATION ADULT - GAIT DISTANCE, PT EVAL
limited ambulation, pt c/o feeling dizzy and nauseous, BP sitting 120/75 HR 98, standing /71 /25 feet

## 2023-06-18 NOTE — PHYSICAL THERAPY INITIAL EVALUATION ADULT - ADDITIONAL COMMENTS
Pt lives with spouse in home with 3-4 steps to enter, will reside on 1st floor. Pt was using RW for recent hip sx.

## 2023-06-18 NOTE — PHYSICAL THERAPY INITIAL EVALUATION ADULT - GENERAL OBSERVATIONS, REHAB EVAL
Patient has lost some weight since her last visit.  Reviewed healthy BMI range and again encouraged weight loss or lifestyle changes   182-850 Pt received and left semifowlers in bed, NAD, pt agreeable to PT session +PCA +IV

## 2023-06-18 NOTE — DISCHARGE NOTE NURSING/CASE MANAGEMENT/SOCIAL WORK - PATIENT PORTAL LINK FT
You can access the FollowMyHealth Patient Portal offered by [facility] by registering at the following website: [email or web address]. By joining [facility]’s FollowMyHealth portal, you will also be able to view your health information using other applications (apps) compatible with our system.

## 2023-06-18 NOTE — PROGRESS NOTE ADULT - ASSESSMENT
56 yo F admitted to [facility] on 6/16/23 for L2-3, L3-4 XLIF; L2-4 posterior instrumentation for lumbar spondylosis.     A/P:   Chronic Back Pain due to Spinal stenosis:   s/p L2-3, L3-4 lateral interbody fusion, L2-4 percutaneous stabilization 6/16  Pain management with hydromorphone pump.   Physiatry and PT follow up.     Transaminitis:   No abdominal pain, , , trending down, TB normal.   Possibly from Acetaminophen and NSAIDs. Avoid Tylenol  Monitor LFTs.     HTN: continue Atenolol.     
58 yo F admitted to [facility] on 6/16/23 for L2-3, L3-4 XLIF; L2-4 posterior instrumentation for lumbar spondylosis.     A/P:   Chronic Back Pain due to Spinal stenosis:   s/p L2-3, L3-4 lateral interbody fusion, L2-4 percutaneous stabilization 6/16  Pain management with hydromorphone pump.   Physiatry and PT follow up.     Transaminitis:   No abdominal pain, , , trending down, TB normal.   Possibly from Acetaminophen and NSAIDs. Can continue with Tylenol as LFTs are improving   Monitor LFTs.     HTN: continue Atenolol.   
Imp: Rehab of lumbar spondylosis / s/p  L2-3, L3-4 XLIF; L2-4 posterior instrumentation  Plan: continue bedside therapy as tolerated          pain control         DVT prophylaxis          D/C home with services when medically cleared

## 2023-06-19 ENCOUNTER — TRANSCRIPTION ENCOUNTER (OUTPATIENT)
Age: 58
End: 2023-06-19

## 2023-06-19 VITALS
HEART RATE: 62 BPM | SYSTOLIC BLOOD PRESSURE: 122 MMHG | DIASTOLIC BLOOD PRESSURE: 70 MMHG | OXYGEN SATURATION: 97 % | RESPIRATION RATE: 18 BRPM | TEMPERATURE: 97 F

## 2023-06-19 LAB
ALBUMIN SERPL ELPH-MCNC: 3.4 G/DL — LOW (ref 3.5–5.2)
ALP SERPL-CCNC: 184 U/L — HIGH (ref 30–115)
ALT FLD-CCNC: 191 U/L — HIGH (ref 0–41)
ANION GAP SERPL CALC-SCNC: 11 MMOL/L — SIGNIFICANT CHANGE UP (ref 7–14)
AST SERPL-CCNC: 197 U/L — HIGH (ref 0–41)
BILIRUB SERPL-MCNC: 0.3 MG/DL — SIGNIFICANT CHANGE UP (ref 0.2–1.2)
BUN SERPL-MCNC: 9 MG/DL — LOW (ref 10–20)
CALCIUM SERPL-MCNC: 8.9 MG/DL — SIGNIFICANT CHANGE UP (ref 8.4–10.5)
CHLORIDE SERPL-SCNC: 99 MMOL/L — SIGNIFICANT CHANGE UP (ref 98–110)
CO2 SERPL-SCNC: 26 MMOL/L — SIGNIFICANT CHANGE UP (ref 17–32)
CREAT SERPL-MCNC: 0.7 MG/DL — SIGNIFICANT CHANGE UP (ref 0.7–1.5)
EGFR: 100 ML/MIN/1.73M2 — SIGNIFICANT CHANGE UP
GLUCOSE SERPL-MCNC: 137 MG/DL — HIGH (ref 70–99)
POTASSIUM SERPL-MCNC: 3.3 MMOL/L — LOW (ref 3.5–5)
POTASSIUM SERPL-SCNC: 3.3 MMOL/L — LOW (ref 3.5–5)
PROT SERPL-MCNC: 5.8 G/DL — LOW (ref 6–8)
SODIUM SERPL-SCNC: 136 MMOL/L — SIGNIFICANT CHANGE UP (ref 135–146)

## 2023-06-19 RX ORDER — PANTOPRAZOLE SODIUM 20 MG/1
1 TABLET, DELAYED RELEASE ORAL
Qty: 0 | Refills: 0 | DISCHARGE

## 2023-06-19 RX ORDER — ACETAMINOPHEN 500 MG
650 TABLET ORAL EVERY 6 HOURS
Refills: 0 | Status: DISCONTINUED | OUTPATIENT
Start: 2023-06-19 | End: 2023-06-19

## 2023-06-19 RX ORDER — OXYCODONE HYDROCHLORIDE 5 MG/1
1 TABLET ORAL
Qty: 20 | Refills: 0
Start: 2023-06-19 | End: 2023-06-23

## 2023-06-19 RX ORDER — METHOCARBAMOL 500 MG/1
1 TABLET, FILM COATED ORAL
Qty: 42 | Refills: 0
Start: 2023-06-19 | End: 2023-07-02

## 2023-06-19 RX ORDER — OXYCODONE HYDROCHLORIDE 5 MG/1
1 TABLET ORAL
Qty: 0 | Refills: 0 | DISCHARGE
Start: 2023-06-19

## 2023-06-19 RX ADMIN — HYDROMORPHONE HYDROCHLORIDE 1 MILLIGRAM(S): 2 INJECTION INTRAMUSCULAR; INTRAVENOUS; SUBCUTANEOUS at 05:47

## 2023-06-19 RX ADMIN — PANTOPRAZOLE SODIUM 40 MILLIGRAM(S): 20 TABLET, DELAYED RELEASE ORAL at 05:50

## 2023-06-19 RX ADMIN — Medication 5 MILLIGRAM(S): at 09:20

## 2023-06-19 RX ADMIN — HEPARIN SODIUM 5000 UNIT(S): 5000 INJECTION INTRAVENOUS; SUBCUTANEOUS at 05:51

## 2023-06-19 RX ADMIN — ATENOLOL 25 MILLIGRAM(S): 25 TABLET ORAL at 05:50

## 2023-06-19 RX ADMIN — METHOCARBAMOL 750 MILLIGRAM(S): 500 TABLET, FILM COATED ORAL at 05:50

## 2023-06-19 NOTE — DISCHARGE NOTE PROVIDER - NSDCFUSCHEDAPPT_GEN_ALL_CORE_FT
[facility]  NEUROSURG [address] [place] [address]  Scheduled Appointment: 06/28/2023

## 2023-06-19 NOTE — DISCHARGE NOTE PROVIDER - CARE PROVIDER_API CALL
[facility]  Neurosurgery  [address] [place] [address], [address] [address]  Phone: [phone]  Fax: [phone]  Follow Up Time: 2 weeks

## 2023-06-19 NOTE — DISCHARGE NOTE PROVIDER - NSDCCPTREATMENT_GEN_ALL_CORE_FT
PRINCIPAL PROCEDURE  Procedure: Fusion, spine, XLIF, 2 levels  Findings and Treatment:       SECONDARY PROCEDURE  Procedure: Percutaneous insertion of pedicle-based spinal stabilization device into joint of lumbar vertebra  Findings and Treatment:

## 2023-06-19 NOTE — DISCHARGE NOTE PROVIDER - NSDCFUADDINST_GEN_ALL_CORE_FT
- Upon discharge,  please call to schedule a follow up with Dr. Taylor in 1-2 weeks.  - Upon discharge, please call to make a follow up appointment with your primary care provider to discuss your recent hospitalization/operation.  - Run water and soap over incision sites and pat dry (no scrubbing). No submerging your incision sites in water (i.e. no swimming or baths) for 2-3 weeks and avoid exposing the area to jets/streams of water.   - You can resume your normal activities as tolerated, but avoid heavy (>15lb.) lifting and strenuous exercise for 4-6 weeks.    - ***You were prescribed narcotic pain medications, take these only as needed.  Your pain should subside over the next few days.  While taking narcotic pain medications, you should not drive or operate heavy machinery.  If you were prescribed Percocet (oxycodone-acetaminophen) and are taking it with other medications containing acetaminophen (Tylenol), reduce your dose of percocet so that you  do not exceed 3,000 mg of acetaminophen per day.  - Narcotic pain medicine tends to cause constipation, you have can take an over-the-counter stool softener 3 times a day to help prevent that. Hold the stool softener if you start to have loose stools.  - If you experience fevers, chills, increasing abdominal pain, nausea, vomiting, inability to pass stool or gas, bleeding, or any other acute symptoms, please call your doctor and report to the emergency room immediately for further management.

## 2023-06-19 NOTE — PROGRESS NOTE ADULT - SUBJECTIVE AND OBJECTIVE BOX
EFRAIN CLEMENT  57y  Female      Patient is a 57y old  Female who presents with a chief complaint of LBP (18 Jun 2023 10:55)      INTERVAL HPI/OVERNIGHT EVENTS:  She feels better, her pain is better controlled, no abdominal pain.   Vital Signs Last 24 Hrs  T(C): 36.9 (18 Jun 2023 08:35), Max: 36.9 (18 Jun 2023 08:35)  T(F): 98.5 (18 Jun 2023 08:35), Max: 98.5 (18 Jun 2023 08:35)  HR: 66 (18 Jun 2023 11:30) (65 - 81)  BP: 149/78 (18 Jun 2023 11:30) (137/77 - 166/78)  BP(mean): --  RR: 18 (18 Jun 2023 11:30) (18 - 18)  SpO2: 99% (18 Jun 2023 05:00) (98% - 100%)          06-17-23 @ 07:01  -  06-18-23 @ 07:00  --------------------------------------------------------  IN: 0 mL / OUT: 290 mL / NET: -290 mL            Consultant(s) Notes Reviewed:  [x ] YES  [ ] NO          MEDICATIONS  (STANDING):  acetaminophen     Tablet .. 1000 milliGRAM(s) Oral every 8 hours  atenolol  Tablet 25 milliGRAM(s) Oral daily  ceFAZolin   IVPB 1000 milliGRAM(s) IV Intermittent every 8 hours  heparin   Injectable 5000 Unit(s) SubCutaneous every 8 hours  methocarbamol 750 milliGRAM(s) Oral every 6 hours  pantoprazole    Tablet 40 milliGRAM(s) Oral before breakfast  polyethylene glycol 3350 17 Gram(s) Oral daily  senna 2 Tablet(s) Oral at bedtime    MEDICATIONS  (PRN):  HYDROmorphone  Injectable 1 milliGRAM(s) IV Push every 3 hours PRN Severe Pain (7 - 10)  naloxone Injectable 0.1 milliGRAM(s) IV Push every 3 minutes PRN For ANY of the following changes in patient status:  A. RR LESS THAN 10 breaths per minute, B. Oxygen saturation LESS THAN 90%, C. Sedation score of 6  ondansetron Injectable 4 milliGRAM(s) IV Push every 6 hours PRN Nausea  ondansetron Injectable 4 milliGRAM(s) IV Push every 6 hours PRN Nausea and/or Vomiting  oxyCODONE    IR 5 milliGRAM(s) Oral every 4 hours PRN Mild Pain (1 - 3)  oxyCODONE    IR 10 milliGRAM(s) Oral every 4 hours PRN Moderate Pain (4 - 6)      LABS                          10.5   7.27  )-----------( 299      ( 17 Jun 2023 06:30 )             32.3     06-17    139  |  102  |  13  ----------------------------<  108<H>  4.8   |  28  |  0.7    Ca    9.2      17 Jun 2023 06:30  Mg     1.9     06-17    TPro  6.1  /  Alb  3.6  /  TBili  0.4  /  DBili  <0.2  /  AST  220<H>  /  ALT  217<H>  /  AlkPhos  129<H>  06-17          Lactate Trend        CAPILLARY BLOOD GLUCOSE            RADIOLOGY & ADDITIONAL TESTS:    Imaging Personally Reviewed:  [ ] YES  [ ] NO    HEALTH ISSUES - PROBLEM Dx:          PHYSICAL EXAM:  GENERAL: NAD, well-developed.  HEAD:  Atraumatic, Normocephalic.  EYES: EOMI, PERRLA, conjunctiva and sclera clear.  NECK: Supple, No JVD.  CHEST/LUNG: Clear to auscultation bilaterally; No wheeze.  HEART: Regular rate and rhythm; S1 S2.   ABDOMEN: Soft, Nontender, Nondistended; Bowel sounds present.  EXTREMITIES:  2+ Peripheral Pulses, No clubbing, cyanosis, or edema.  PSYCH: AAOx3.  NEUROLOGY: non-focal.  SKIN: No rashes or lesions.  
POD#1    S/P L2-4 XLIF, L2-4 Percutaneous Stabilization    Pt seen and examined at bedside. Pt c/o incisional pain.    Vital Signs Last 24 Hrs  T(C): 36 (17 Jun 2023 12:23), Max: 36.6 (16 Jun 2023 16:00)  T(F): 96.8 (17 Jun 2023 12:23), Max: 97.9 (16 Jun 2023 16:00)  HR: 74 (17 Jun 2023 08:48) (54 - 74)  BP: 158/72 (17 Jun 2023 12:23) (122/61 - 167/69)  BP(mean): --  RR: 18 (17 Jun 2023 12:23) (10 - 20)  SpO2: 98% (17 Jun 2023 12:23) (95% - 100%)    Parameters below as of 16 Jun 2023 20:57  Patient On (Oxygen Delivery Method): room air        I&O's Detail    16 Jun 2023 07:01  -  17 Jun 2023 07:00  --------------------------------------------------------  IN:    Lactated Ringers: 500 mL  Total IN: 500 mL    OUT:    Indwelling Catheter - Urethral (mL): 1100 mL  Total OUT: 1100 mL    Total NET: -600 mL        I&O's Summary    16 Jun 2023 07:01  -  17 Jun 2023 07:00  --------------------------------------------------------  IN: 500 mL / OUT: 1100 mL / NET: -600 mL    REVIEW OF SYSTEMS    [X] A ten-point review of systems was otherwise negative except as noted.  [ ] Due to altered mental status/intubation, subjective information were not able to be obtained from the patient. History was obtained, to the extent possible, from review of the chart and collateral sources of information.      PHYSICAL EXAM:  Neurological:  Moving LE - good strength  + sensation   Dressings + bloody d/c      LABS:                        10.5   7.27  )-----------( 299      ( 17 Jun 2023 06:30 )             32.3     06-17    139  |  102  |  13  ----------------------------<  108<H>  4.8   |  28  |  0.7    Ca    9.2      17 Jun 2023 06:30  Mg     1.9     06-17    TPro  6.1  /  Alb  3.6  /  TBili  0.4  /  DBili  <0.2  /  AST  220<H>  /  ALT  217<H>  /  AlkPhos  129<H>  06-17    Allergies    Mobic (Swelling)    Intolerances      MEDICATIONS:  Antibiotics:  ceFAZolin   IVPB 1000 milliGRAM(s) IV Intermittent every 8 hours    Neuro:  acetaminophen     Tablet .. 1000 milliGRAM(s) Oral every 8 hours  HYDROmorphone PCA (1 mG/mL) 30 milliLiter(s) PCA Continuous PCA Continuous  methocarbamol 750 milliGRAM(s) Oral every 6 hours  ondansetron Injectable 4 milliGRAM(s) IV Push every 6 hours PRN  ondansetron Injectable 4 milliGRAM(s) IV Push every 6 hours PRN    ASSESSMENT:  57y Female s/p    Spondylosis of lumbar region without myelopathy or radiculopathy    Handoff    MEWS Score    HTN (hypertension)    GERD (gastroesophageal reflux disease)    S/P hysterectomy    Acquired spinal deformity    Lumbar stenosis with neurogenic claudication    Acquired spinal deformity    Lumbar stenosis with neurogenic claudication    Fusion, spine, XLIF, 2 levels    Percutaneous insertion of pedicle-based spinal stabilization device into joint of lumbar vertebra    [id number]    SysAdmin_VstLnk        PLAN:  SQ Heparin  D/C Perez  PCA  PT/OT
Subjective: 57yFemale with a pmhx of Spondylosis of lumbar region without myelopathy or radiculopathy    Handoff    MEWS Score    HTN (hypertension)    GERD (gastroesophageal reflux disease)    S/P hysterectomy    Acquired spinal deformity    Lumbar stenosis with neurogenic claudication    Acquired spinal deformity    Lumbar stenosis with neurogenic claudication    Fusion, spine, XLIF, 2 levels    Percutaneous insertion of pedicle-based spinal stabilization device into joint of lumbar vertebra    [id number];[id number]    SysAdmin_VstLnk      Patient is a 57 year-old female POD#3 s/p L2-L3, L3-L4 XLIF, L2-L4 posterior stabilization. Patient was seen and examined at bedside on 4C. Patient sitting upright in bed A&Ox3 and following all commands. Patient admits to persistent pain and claims she has not had a bowel movement yet. Pt is passing gas. Otherwise, patient states she is doing well and denies weakness, radiation of pain to lower extremities, numbness, tingling, headaches, nausea, vomiting at this time. Patient claims she has been OOB and ambulating well.    Allergies    Mobic (Swelling)    Intolerances        Vital Signs Last 24 Hrs  T(C): 36.2 (19 Jun 2023 08:48), Max: 36.8 (18 Jun 2023 16:59)  T(F): 97.1 (19 Jun 2023 08:48), Max: 98.3 (18 Jun 2023 16:59)  HR: 62 (19 Jun 2023 08:48) (59 - 75)  BP: 122/70 (19 Jun 2023 08:48) (106/72 - 149/78)  BP(mean): --  RR: 18 (19 Jun 2023 08:48) (18 - 18)  SpO2: 97% (19 Jun 2023 08:48) (97% - 100%)      acetaminophen     Tablet .. 650 milliGRAM(s) Oral every 6 hours PRN  atenolol  Tablet 25 milliGRAM(s) Oral daily  bisacodyl 5 milliGRAM(s) Oral every 12 hours  heparin   Injectable 5000 Unit(s) SubCutaneous every 8 hours  HYDROmorphone  Injectable 1 milliGRAM(s) IV Push every 3 hours PRN  methocarbamol 750 milliGRAM(s) Oral every 6 hours  naloxone Injectable 0.1 milliGRAM(s) IV Push every 3 minutes PRN  ondansetron Injectable 4 milliGRAM(s) IV Push every 6 hours PRN  ondansetron Injectable 4 milliGRAM(s) IV Push every 6 hours PRN  oxyCODONE    IR 5 milliGRAM(s) Oral every 4 hours PRN  oxyCODONE    IR 10 milliGRAM(s) Oral every 4 hours PRN  pantoprazole    Tablet 40 milliGRAM(s) Oral before breakfast  polyethylene glycol 3350 17 Gram(s) Oral daily  senna 2 Tablet(s) Oral at bedtime          REVIEW OF SYSTEMS    [X] A ten-point review of systems was otherwise negative except as noted.  [ ] Due to altered mental status/intubation, subjective information were not able to be obtained from the patient. History was obtained, to the extent possible, from review of the chart and collateral sources of information.      Physical Exam:  General: Lying in bed, following all commands  AAOX3. Verbal function intact  Facial motions symmetric  EOMI  Motor: MAEx4, good bulk and tone  5/5 strength in b/l UE's and LE's  Sensation: intact to touch in all extremities  Wound: Posterior and lateral incisions clean, dry, and intact without drainage        06-19    136  |  99  |  9<L>  ----------------------------<  137<H>  3.3<L>   |  26  |  0.7    Ca    8.9      19 Jun 2023 05:05    TPro  5.8<L>  /  Alb  3.4<L>  /  TBili  0.3  /  DBili  x   /  AST  197<H>  /  ALT  191<H>  /  AlkPhos  184<H>  06-19              Assessment/Plan:   57 year-old female POD#3 s/p L2-L3, L3-L4 XLIF, L2-L4 posterior stabilization.  -PRN analgesia  -PT/OT/Rehab, ambulating well, to receive home PT upon d/c   -DVT ppx, SQH  -Bowel regimen  -Encourage incentive spirometry  -D/c home today w/ home services  -D/w attending  
  EFRAIN CLEMENT  57y  Female      Patient is a 57y old  Female who presents with a chief complaint of post op (16 Jun 2023 18:51)      INTERVAL HPI/OVERNIGHT EVENTS:  Pain is better with IV pump.   Vital Signs Last 24 Hrs  T(C): 36 (17 Jun 2023 12:23), Max: 36.6 (16 Jun 2023 16:00)  T(F): 96.8 (17 Jun 2023 12:23), Max: 97.9 (16 Jun 2023 16:00)  HR: 74 (17 Jun 2023 08:48) (54 - 74)  BP: 158/72 (17 Jun 2023 12:23) (122/61 - 167/69)  BP(mean): --  RR: 18 (17 Jun 2023 12:23) (10 - 20)  SpO2: 98% (17 Jun 2023 12:23) (95% - 100%)    Parameters below as of 16 Jun 2023 20:57  Patient On (Oxygen Delivery Method): room air          06-16-23 @ 07:01  -  06-17-23 @ 07:00  --------------------------------------------------------  IN: 500 mL / OUT: 1100 mL / NET: -600 mL            Consultant(s) Notes Reviewed:  [x ] YES  [ ] NO          MEDICATIONS  (STANDING):  acetaminophen     Tablet .. 1000 milliGRAM(s) Oral every 8 hours  atenolol  Tablet 25 milliGRAM(s) Oral daily  ceFAZolin   IVPB 1000 milliGRAM(s) IV Intermittent every 8 hours  heparin   Injectable 5000 Unit(s) SubCutaneous every 8 hours  HYDROmorphone PCA (1 mG/mL) 30 milliLiter(s) PCA Continuous PCA Continuous  methocarbamol 750 milliGRAM(s) Oral every 6 hours  pantoprazole    Tablet 40 milliGRAM(s) Oral before breakfast  polyethylene glycol 3350 17 Gram(s) Oral daily  senna 2 Tablet(s) Oral at bedtime    MEDICATIONS  (PRN):  naloxone Injectable 0.1 milliGRAM(s) IV Push every 3 minutes PRN For ANY of the following changes in patient status:  A. RR LESS THAN 10 breaths per minute, B. Oxygen saturation LESS THAN 90%, C. Sedation score of 6  ondansetron Injectable 4 milliGRAM(s) IV Push every 6 hours PRN Nausea  ondansetron Injectable 4 milliGRAM(s) IV Push every 6 hours PRN Nausea and/or Vomiting      LABS                          10.5   7.27  )-----------( 299      ( 17 Jun 2023 06:30 )             32.3     06-17    139  |  102  |  13  ----------------------------<  108<H>  4.8   |  28  |  0.7    Ca    9.2      17 Jun 2023 06:30  Mg     1.9     06-17    TPro  6.1  /  Alb  3.6  /  TBili  0.4  /  DBili  <0.2  /  AST  220<H>  /  ALT  217<H>  /  AlkPhos  129<H>  06-17          Lactate Trend        CAPILLARY BLOOD GLUCOSE            RADIOLOGY & ADDITIONAL TESTS:    Imaging Personally Reviewed:  [ ] YES  [ ] NO    HEALTH ISSUES - PROBLEM Dx:          PHYSICAL EXAM:  GENERAL: NAD, well-developed.  HEAD:  Atraumatic, Normocephalic.  EYES: EOMI, PERRLA, conjunctiva and sclera clear.  NECK: Supple, No JVD.  CHEST/LUNG: Clear to auscultation bilaterally; No wheeze.  HEART: Regular rate and rhythm; S1 S2.   ABDOMEN: Soft, Nontender, Nondistended; Bowel sounds present.  EXTREMITIES:  2+ Peripheral Pulses, No clubbing, cyanosis, or edema.  PSYCH: AAOx3.  NEUROLOGY: non-focal.  SKIN: No rashes or lesions.
NEUROSURGERY POST OP NOTE:    POD# 0 S/P L2-3, L3-4 lateral interbody fusion, L2-4 percutaneous stabilization    S: no complaints      T(C): 35.9 (06-16-23 @ 18:27), Max: 36.6 (06-16-23 @ 16:00)  HR: 68 (06-16-23 @ 18:27) (54 - 71)  BP: 131/64 (06-16-23 @ 18:27) (103/57 - 167/69)  RR: 18 (06-16-23 @ 18:27) (10 - 20)  SpO2: 95% (06-16-23 @ 18:27) (95% - 100%)    06-16-23 @ 07:01  -  06-16-23 @ 18:52  --------------------------------------------------------  IN: 500 mL / OUT: 400 mL / NET: 100 mL    acetaminophen     Tablet .. 1000 milliGRAM(s) Oral every 8 hours  atenolol  Tablet 25 milliGRAM(s) Oral daily  ceFAZolin   IVPB 1000 milliGRAM(s) IV Intermittent every 8 hours  HYDROmorphone PCA (1 mG/mL) 30 milliLiter(s) PCA Continuous PCA Continuous  naloxone Injectable 0.1 milliGRAM(s) IV Push every 3 minutes PRN  ondansetron Injectable 4 milliGRAM(s) IV Push every 6 hours PRN  ondansetron Injectable 4 milliGRAM(s) IV Push every 6 hours PRN  pantoprazole    Tablet 40 milliGRAM(s) Oral before breakfast  polyethylene glycol 3350 17 Gram(s) Oral daily  senna 2 Tablet(s) Oral at bedtime  RADIOLOGY:     Exam: awake, alert, NAD, follows commands, GAYLE with good strength and  coordination, moving around in bed, dressing in back clean dry, left side wound clean dry intact    Assessment: 57yFemale s/p  L2-3, L3-4 lateral interbody fusion, L2-4 percutaneous stabilization, doing well      Plan: Continue PCA, pérez, incentive spirometer, PT, HSQ in AM.      
Patient is a 57y old  Female who presents with a chief complaint of post op       HPI: 58 yo F admitted to [facility] on 6/16/23 for L2-3, L3-4 XLIF; L2-4 posterior instrumentation for lumbar spondylosis. Prior hospitalization she was ambulating independent w/o AD. Rehab consulted for eval.       Medical charts / PT notes reviewed     PHYSICAL EXAM    Vital Signs Last 24 Hrs  T(C): 36.9 (18 Jun 2023 08:35), Max: 36.9 (18 Jun 2023 08:35)  T(F): 98.5 (18 Jun 2023 08:35), Max: 98.5 (18 Jun 2023 08:35)  HR: 72 (18 Jun 2023 08:35) (65 - 81)  BP: 156/77 (18 Jun 2023 08:35) (137/77 - 166/78)  BP(mean): --  RR: 18 (18 Jun 2023 08:35) (18 - 18)  SpO2: 99% (18 Jun 2023 05:00) (98% - 100%)        Constitutional - NAD  Chest - CTA  Cardiovascular - S1S2+  Abdomen -  Soft  Extremities -  No calf tenderness   Function : bed mobility / transfer CG to sup                ambulate 25' CG    acetaminophen     Tablet .. 1000 milliGRAM(s) Oral every 8 hours  atenolol  Tablet 25 milliGRAM(s) Oral daily  ceFAZolin   IVPB 1000 milliGRAM(s) IV Intermittent every 8 hours  heparin   Injectable 5000 Unit(s) SubCutaneous every 8 hours  HYDROmorphone  Injectable 1 milliGRAM(s) IV Push every 3 hours PRN  methocarbamol 750 milliGRAM(s) Oral every 6 hours  naloxone Injectable 0.1 milliGRAM(s) IV Push every 3 minutes PRN  ondansetron Injectable 4 milliGRAM(s) IV Push every 6 hours PRN  ondansetron Injectable 4 milliGRAM(s) IV Push every 6 hours PRN  oxyCODONE    IR 5 milliGRAM(s) Oral every 4 hours PRN  oxyCODONE    IR 10 milliGRAM(s) Oral every 4 hours PRN  pantoprazole    Tablet 40 milliGRAM(s) Oral before breakfast  polyethylene glycol 3350 17 Gram(s) Oral daily  senna 2 Tablet(s) Oral at bedtime      RECENT LABS/IMAGING                        10.5   7.27  )-----------( 299      ( 17 Jun 2023 06:30 )             32.3     06-17    139  |  102  |  13  ----------------------------<  108<H>  4.8   |  28  |  0.7    Ca    9.2      17 Jun 2023 06:30  Mg     1.9     06-17    TPro  6.1  /  Alb  3.6  /  TBili  0.4  /  DBili  <0.2  /  AST  220<H>  /  ALT  217<H>  /  AlkPhos  129<H>  06-17              
Hospital Course:   POD# 2, L2-3, L3-4 XLIF, L2-4 percutaneous stabilization    Vital Signs Last 24 Hrs  T(C): 36.9 (18 Jun 2023 08:35), Max: 36.9 (18 Jun 2023 08:35)  T(F): 98.5 (18 Jun 2023 08:35), Max: 98.5 (18 Jun 2023 08:35)  HR: 66 (18 Jun 2023 11:30) (65 - 81)  BP: 149/78 (18 Jun 2023 11:30) (137/77 - 166/78)  BP(mean): --  RR: 18 (18 Jun 2023 11:30) (18 - 18)  SpO2: 99% (18 Jun 2023 05:00) (98% - 100%)    I&O's Detail    17 Jun 2023 07:01  -  18 Jun 2023 07:00  --------------------------------------------------------  IN:  Total IN: 0 mL    OUT:    Voided (mL): 290 mL  Total OUT: 290 mL    Total NET: -290 mL    I&O's Summary    17 Jun 2023 07:01  -  18 Jun 2023 07:00  --------------------------------------------------------  IN: 0 mL / OUT: 290 mL / NET: -290 mL    PHYSICAL EXAM:  Neurological: awake, alert, NAD, WDWN 56 y/o, NAD, back pain on movement, no leg pain, tolerating diet, no calf tenderness, cooperative, follows commands, GAYLE with good strength and coordination      Incision/Wound: left flank and back incisions clean and dry      LABS:                        10.5   7.27  )-----------( 299      ( 17 Jun 2023 06:30 )             32.3     06-17    139  |  102  |  13  ----------------------------<  108<H>  4.8   |  28  |  0.7    Ca    9.2      17 Jun 2023 06:30  Mg     1.9     06-17    TPro  6.1  /  Alb  3.6  /  TBili  0.4  /  DBili  <0.2  /  AST  220<H>  /  ALT  217<H>  /  AlkPhos  129<H>  06-17    CAPILLARY BLOOD GLUCOSE    Drug Levels: [] N/A    CSF Analysis: [] N/A    Allergies    Mobic (Swelling)    Intolerances      MEDICATIONS:  Antibiotics:    Neuro:  acetaminophen     Tablet .. 1000 milliGRAM(s) Oral every 8 hours  HYDROmorphone  Injectable 1 milliGRAM(s) IV Push every 3 hours PRN  methocarbamol 750 milliGRAM(s) Oral every 6 hours  ondansetron Injectable 4 milliGRAM(s) IV Push every 6 hours PRN  ondansetron Injectable 4 milliGRAM(s) IV Push every 6 hours PRN  oxyCODONE    IR 5 milliGRAM(s) Oral every 4 hours PRN  oxyCODONE    IR 10 milliGRAM(s) Oral every 4 hours PRN    Anticoagulation:  heparin   Injectable 5000 Unit(s) SubCutaneous every 8 hours    OTHER:  atenolol  Tablet 25 milliGRAM(s) Oral daily  naloxone Injectable 0.1 milliGRAM(s) IV Push every 3 minutes PRN  pantoprazole    Tablet 40 milliGRAM(s) Oral before breakfast  polyethylene glycol 3350 17 Gram(s) Oral daily  senna 2 Tablet(s) Oral at bedtime    IVF:    CULTURES:    RADIOLOGY & ADDITIONAL TESTS:      ASSESSMENT:  57y Female s/p L2-3, 3-4 XLIF, L2-4 screw stabilization POD # 2, PCA d/c'd    Spondylosis of lumbar region without myelopathy or radiculopathy    Handoff    MEWS Score    HTN (hypertension)    GERD (gastroesophageal reflux disease)    S/P hysterectomy    Acquired spinal deformity    Lumbar stenosis with neurogenic claudication    Acquired spinal deformity    Lumbar stenosis with neurogenic claudication    Fusion, spine, XLIF, 2 levels    Percutaneous insertion of pedicle-based spinal stabilization device into joint of lumbar vertebra    [id number];67649;[id number]    SysAdmin_VstLnk    PLAN: switch over to PO/IV analgesics as needed, PT, OT, CMP in am.

## 2023-06-19 NOTE — DISCHARGE NOTE PROVIDER - NSDCCPCAREPLAN_GEN_ALL_CORE_FT
PRINCIPAL DISCHARGE DIAGNOSIS  Diagnosis: Postoperative back pain  Assessment and Plan of Treatment:

## 2023-06-19 NOTE — DISCHARGE NOTE PROVIDER - HOSPITAL COURSE
58 yo female w/ PMH of HTN, HLD, GERD. Pt was admitted to the neurosurgical service and subsequently underwent an L2-L3, L3-L4 XLIF, L2-L4 posterior stabilization with Dr. Taylor on 6/16/23. Pt tolerated procedure well and postoperatively was transferred to the PACU and subsequently to the floor. Patient's diet was advanced as tolerated and pain was controlled with IV, and subsequently po, pain medication as needed. Pt's post-operative hospital course was uncomplicated.    At time of discharge, the patient was afebrile, tolerating a regular diet, voiding appropriately, ambulating without difficulty, making flatus, and the patient's pain was well controlled. VS were WNL and pt was hemodynamically stable. Pt was medically cleared for discharge and patient was provided with discharge instructions regarding, but not limited to medication regimen and follow up.

## 2023-06-19 NOTE — DISCHARGE NOTE PROVIDER - NSDCMRMEDTOKEN_GEN_ALL_CORE_FT
atenolol 25 mg oral tablet: 1 tab(s) orally once a day  Dulcolax Laxative 5 mg oral tablet: 1 tab(s) orally 2 times a day as needed for  constipation  methocarbamol 750 mg oral tablet: 1 tab(s) orally every 8 hours as needed for  muscle spasm  oxyCODONE 5 mg oral tablet: 1 tab(s) orally every 4 hours As needed Mild Pain (1 - 3)  pantoprazole 40 mg oral delayed release tablet: 1 delayed release tablet orally once a day

## 2023-06-29 ENCOUNTER — APPOINTMENT (OUTPATIENT)
Dept: NEUROSURGERY | Facility: CLINIC | Age: 58
End: 2023-06-29
Payer: MEDICAID

## 2023-06-29 VITALS — WEIGHT: 150 LBS | HEIGHT: 61 IN | BODY MASS INDEX: 28.32 KG/M2

## 2023-06-29 DIAGNOSIS — I10 ESSENTIAL (PRIMARY) HYPERTENSION: ICD-10-CM

## 2023-06-29 DIAGNOSIS — M47.26 OTHER SPONDYLOSIS WITH RADICULOPATHY, LUMBAR REGION: ICD-10-CM

## 2023-06-29 DIAGNOSIS — M43.16 SPONDYLOLISTHESIS, LUMBAR REGION: ICD-10-CM

## 2023-06-29 DIAGNOSIS — Z98.890 OTHER SPECIFIED POSTPROCEDURAL STATES: ICD-10-CM

## 2023-06-29 DIAGNOSIS — Z88.8 ALLERGY STATUS TO OTHER DRUGS, MEDICAMENTS AND BIOLOGICAL SUBSTANCES: ICD-10-CM

## 2023-06-29 DIAGNOSIS — R74.01 ELEVATION OF LEVELS OF LIVER TRANSAMINASE LEVELS: ICD-10-CM

## 2023-06-29 DIAGNOSIS — E78.5 HYPERLIPIDEMIA, UNSPECIFIED: ICD-10-CM

## 2023-06-29 DIAGNOSIS — M48.062 SPINAL STENOSIS, LUMBAR REGION WITH NEUROGENIC CLAUDICATION: ICD-10-CM

## 2023-06-29 DIAGNOSIS — M41.86 OTHER FORMS OF SCOLIOSIS, LUMBAR REGION: ICD-10-CM

## 2023-06-29 DIAGNOSIS — K21.9 GASTRO-ESOPHAGEAL REFLUX DISEASE WITHOUT ESOPHAGITIS: ICD-10-CM

## 2023-06-29 PROBLEM — Z90.710 ACQUIRED ABSENCE OF BOTH CERVIX AND UTERUS: Chronic | Status: ACTIVE | Noted: 2023-06-16

## 2023-06-29 PROCEDURE — 99024 POSTOP FOLLOW-UP VISIT: CPT

## 2023-06-30 ENCOUNTER — NON-APPOINTMENT (OUTPATIENT)
Age: 58
End: 2023-06-30

## 2023-07-01 NOTE — REASON FOR VISIT
[Family Member] : family member [de-identified] : L2-L3, L3-L4 XLIF, L2-L4 posterior stabilization [de-identified] : 6/16/23

## 2023-07-01 NOTE — ASSESSMENT
[FreeTextEntry1] : Ms. CLEMENT is a 57-year-old female who presents to the office today for her first postoperative visit s/p L2-L3, L3-L4 XLIF, L2-L4 posterior stabilization on 6/16/2023. Patient is doing well with some complaints of residual LLE radiculopathy. Plan is to have the patient return to the office in one month with x-rays of the lumbar spine AP + lateral views for her second postoperative visit. She agreed to this plan and no concerns were identified today.

## 2023-07-01 NOTE — HISTORY OF PRESENT ILLNESS
[FreeTextEntry1] : Ms. Thomas is a 57-year-old female who presents today to the neurosurgery office for her first postoperative visit s/p L2-L3, L3-L4 XLIF, L2-L4 posterior stabilization on 6/16/2023.\par \par Patient is overall doing well, reports lower back pain with radiculopathy to LLE associated with burning and tingling features.  She was educated that this will continue to resolve over time as she is in the early postoperative stage. Surgical sites assessed and are c/d/i with no concerns. \par \par Plan is to return in approximately one month which would be six weeks postoperatively along with x-rays of the lumbar spine with AP + lateral views.

## 2023-07-24 ENCOUNTER — APPOINTMENT (OUTPATIENT)
Dept: NEUROSURGERY | Facility: CLINIC | Age: 58
End: 2023-07-24
Payer: MEDICAID

## 2023-07-24 DIAGNOSIS — M47.816 SPONDYLOSIS W/OUT MYELOPATHY OR RADICULOPATHY, LUMBAR REGION: ICD-10-CM

## 2023-07-24 PROCEDURE — 99024 POSTOP FOLLOW-UP VISIT: CPT

## 2023-07-24 RX ORDER — METHOCARBAMOL 500 MG/1
500 TABLET, FILM COATED ORAL 3 TIMES DAILY
Qty: 90 | Refills: 0 | Status: ACTIVE | COMMUNITY
Start: 2023-07-24 | End: 1900-01-01

## 2023-07-24 NOTE — HISTORY OF PRESENT ILLNESS
[FreeTextEntry1] : 58 yo F presents for second post-operative encounter.  s/p L2-3, L3-4 XLIF posterior stabilization\par doing well.  Still has left lower back pain radiating to her buttock but the radicular component to her pain has resolved.  Wounds have healed.\par \par PT \par return in 6 weeks

## 2023-10-11 ENCOUNTER — APPOINTMENT (OUTPATIENT)
Dept: NEUROSURGERY | Facility: CLINIC | Age: 58
End: 2023-10-11
Payer: MEDICAID

## 2023-10-11 VITALS — BODY MASS INDEX: 28.32 KG/M2 | WEIGHT: 150 LBS | HEIGHT: 61 IN

## 2023-10-11 PROCEDURE — 99214 OFFICE O/P EST MOD 30 MIN: CPT

## 2023-11-10 NOTE — ASU PREOP CHECKLIST - BSA (M2)
[facility]  Hematology/Oncology  [address] [id number]  Phone: [phone]  Fax:     
1.67
Ambulatory

## 2023-11-13 ENCOUNTER — APPOINTMENT (OUTPATIENT)
Dept: NEUROSURGERY | Facility: CLINIC | Age: 58
End: 2023-11-13
Payer: MEDICAID

## 2023-11-13 VITALS — WEIGHT: 150 LBS | HEIGHT: 61 IN | BODY MASS INDEX: 28.32 KG/M2

## 2023-11-13 PROCEDURE — 99211 OFF/OP EST MAY X REQ PHY/QHP: CPT

## 2024-01-08 ENCOUNTER — APPOINTMENT (OUTPATIENT)
Dept: NEUROSURGERY | Facility: CLINIC | Age: 59
End: 2024-01-08
Payer: MEDICAID

## 2024-01-08 VITALS — WEIGHT: 150 LBS | HEIGHT: 61 IN | BODY MASS INDEX: 28.32 KG/M2

## 2024-01-08 DIAGNOSIS — M46.1 SACROILIITIS, NOT ELSEWHERE CLASSIFIED: ICD-10-CM

## 2024-01-08 PROCEDURE — 99211 OFF/OP EST MAY X REQ PHY/QHP: CPT

## 2024-01-08 NOTE — REASON FOR VISIT
[Follow-Up: _____] : a [unfilled] follow-up visit [FreeTextEntry1] : Ms. Thomas had a left hip replacement and L2-3, 3-4 XLIF.  Her radicular pain and midline back pain resolved.  However she has isolated pain over her left SI joint.    point tenderness over left SI joint She had 1 SI joint injection with pain management which did not provide relief.  MRI shows no evidence of nerve impingement  I recommend a left CT guided SI joint injections.  She will return 2 weeks once this is done.

## 2024-01-16 ENCOUNTER — OUTPATIENT (OUTPATIENT)
Dept: OUTPATIENT SERVICES | Facility: HOSPITAL | Age: 59
LOS: 1 days | Discharge: HOME | End: 2024-01-16
Payer: MEDICAID

## 2024-01-16 DIAGNOSIS — M46.1 SACROILIITIS, NOT ELSEWHERE CLASSIFIED: ICD-10-CM

## 2024-01-16 PROCEDURE — G0260: CPT | Mod: LT

## 2024-01-16 NOTE — PROGRESS NOTE ADULT - SUBJECTIVE AND OBJECTIVE BOX
INTERVENTIONAL RADIOLOGY BRIEF-OPERATIVE NOTE    Procedure: LEFT SI joint injection   Pre-Op Diagnosis: back pain   Post-Op Diagnosis: same   Attending: Bladimir   Resident: Aston Orozco  Anesthesia (type):  [ ] General Anesthesia  [ ] Sedation  [ ] Spinal Anesthesia  [x] Local/Regional    Contrast: none   Estimated Blood Loss: Minimal, < 1 cc  Condition:   [ ] Critical  [ ] Serious  [ ] Fair   [x] Good    Findings/Follow up Plan of Care: Successful image guided SI joint injection. Patient tolerated procedure well.   Specimens Removed: none.   Implants: none   Complications: none immediate.     Disposition: Successful image guided SI joint injection.     Please call Interventional Radiology [phone] with any questions, concerns, or issues.         INTERVENTIONAL RADIOLOGY BRIEF-OPERATIVE NOTE    Procedure: LEFT SI joint injection   Pre-Op Diagnosis: back pain   Post-Op Diagnosis: same   Attending: Bladimir   Resident: Aston Orozoc  Anesthesia (type):  [ ] General Anesthesia  [ ] Sedation  [ ] Spinal Anesthesia  [x] Local/Regional    Contrast: none   Estimated Blood Loss: Minimal, < 1 cc  Condition:   [ ] Critical  [ ] Serious  [ ] Fair   [x] Good    Findings/Follow up Plan of Care: Successful image guided SI joint injection. Patient tolerated procedure well.   Specimens Removed: none.   Implants: none   Complications: none immediate.     Disposition: Successful image guided SI joint injection.     Please call Interventional Radiology [phone] with any questions, concerns, or issues.

## 2024-01-17 DIAGNOSIS — M46.1 SACROILIITIS, NOT ELSEWHERE CLASSIFIED: ICD-10-CM

## 2024-02-05 NOTE — OCCUPATIONAL THERAPY INITIAL EVALUATION ADULT - IMPAIRED TRANSFERS: SIT/STAND, REHAB EVAL
Patient would like to transfer care to provider and [facility] - referred to her by friend.  Patient is asking if provider works with Dr. Rocha.     Please advise on if able to schedule.   impaired balance/decreased flexibility/pain

## 2024-04-01 ENCOUNTER — APPOINTMENT (OUTPATIENT)
Dept: HEART AND VASCULAR | Facility: CLINIC | Age: 59
End: 2024-04-01

## 2024-11-27 NOTE — DISCHARGE NOTE PROVIDER - DID THE PATIENT PRESENT WITH OR WAS TREATED FOR MALNUTRITION DURING THIS ADMISSION
Monitor Summary  Rhythm: SR w/ 1st degree  Rate: 61-70  Ectopy: rare PVC and PAC  .29 / .12 / .46             No